# Patient Record
Sex: FEMALE | Race: WHITE
[De-identification: names, ages, dates, MRNs, and addresses within clinical notes are randomized per-mention and may not be internally consistent; named-entity substitution may affect disease eponyms.]

---

## 2018-03-25 ENCOUNTER — HOSPITAL ENCOUNTER (EMERGENCY)
Dept: HOSPITAL 50 - VM.ED | Age: 58
Discharge: HOME | End: 2018-03-25
Payer: COMMERCIAL

## 2018-03-25 DIAGNOSIS — R42: Primary | ICD-10-CM

## 2018-03-25 PROCEDURE — 99283 EMERGENCY DEPT VISIT LOW MDM: CPT

## 2018-03-25 PROCEDURE — 96374 THER/PROPH/DIAG INJ IV PUSH: CPT

## 2018-03-25 PROCEDURE — 96375 TX/PRO/DX INJ NEW DRUG ADDON: CPT

## 2018-03-25 PROCEDURE — 96361 HYDRATE IV INFUSION ADD-ON: CPT

## 2018-03-25 NOTE — EDM.PDOC
ED HPI GENERAL MEDICAL PROBLEM





- General


Chief Complaint: Neurological Problem


Stated Complaint: vertigo


Time Seen by Provider: 03/25/18 18:13


Source of Information: Reports: Patient, RN, RN Notes Reviewed


History Limitations: Reports: No Limitations





- History of Present Illness


INITIAL COMMENTS - FREE TEXT/NARRATIVE: 


Patient presents to the ED at [facility] with acute exacerbation of 

vertigo. Patient was diagnosed several years ago. She usually sees physical 

therapy and take medication, but recently this has not helped. She states she 

was seen by her therapist a couple weeks ago for treatment. Her vertigo was a 

little better but never really completely resolved. She states last night, it 

got worse. She did not take any home medication for the dizziness. She feels 

very nauseated. No focal neurological deficits. Otherwise, no other concerns.


Onset Date: 03/24/18





- Related Data


 Allergies











Allergy/AdvReac Type Severity Reaction Status Date / Time


 


No Known Allergies Allergy   Verified 06/13/16 18:29











Home Meds: 


 Home Meds





. [No Known Home Meds]  06/13/16 [History]











Past Medical History





- Past Health History


Medical/Surgical History: Denies Medical/Surgical History





Social & Family History





- Tobacco Use


Smoking Status *Q: Never Smoker





- Recreational Drug Use


Recreational Drug Use: No





ED ROS GENERAL





- Review of Systems


Review Of Systems: See Below


Constitutional: Denies: Fever, Chills, Weakness


HEENT: Reports: Vertigo


Respiratory: Denies: Shortness of Breath, Cough


Cardiovascular: Denies: Chest Pain, Palpitations


Skin: Reports: No Symptoms


Neurological: Reports: Dizziness.  Denies: Headache, Numbness, Paresthesia, 

Tingling





ED EXAM, DIZZINESS





- Physical Exam


Exam: See Below


Exam Limited By: No Limitations


General Appearance: Alert, No Apparent Distress


Eye Exam: Bilateral Eye: EOMI, Normal Inspection, PERRL


Ears: Normal External Exam, Normal Canal, Normal TMs


Head Exam: Atraumatic, Normocephalic


Vertigo: reproducible


Neck: Supple


Respiratory/Chest: No Respiratory Distress, Lungs Clear, Normal Breath Sounds


Cardiovascular: Normal Peripheral Pulses, Regular Rate, Rhythm


Neurological: Alert, Oriented x 3


Skin Exam: Warm, Dry, Intact, Normal Color





Course





- Vital Signs


Last Recorded V/S: 


 Last Vital Signs











Temp  36.2 C   03/25/18 18:15


 


Pulse  101 H  03/25/18 18:15


 


Resp  16   03/25/18 18:15


 


BP  145/92 H  03/25/18 18:15


 


Pulse Ox  99   03/25/18 18:15














- Orders/Labs/Meds


Orders: 


 Active Orders 24 hr











 Category Date Time Status


 


 Sodium Chloride 0.9% [Saline Flush] Med  03/25/18 18:17 Active





 10 ml FLUSH ASDIRECTED PRN   


 


 Peripheral IV Insertion Adult [OM.PC] Routine Oth  03/25/18 18:17 Ordered








 Medication Orders





Sodium Chloride (Saline Flush)  10 ml FLUSH ASDIRECTED PRN


   PRN Reason: Keep Vein Open








Meds: 


Medications











Generic Name Dose Route Start Last Admin





  Trade Name Freq  PRN Reason Stop Dose Admin


 


Sodium Chloride  10 ml  03/25/18 18:17  





  Saline Flush  FLUSH   





  ASDIRECTED PRN   





  Keep Vein Open   














Discontinued Medications














Generic Name Dose Route Start Last Admin





  Trade Name Freq  PRN Reason Stop Dose Admin


 


Diphenhydramine HCl  50 mg  03/25/18 19:39  03/25/18 19:44





  Benadryl  IVPUSH  03/25/18 19:40  50 mg





  ONETIME ONE   Administration


 


Lactated Ringer's  1,000 mls @ 999 mls/hr  03/25/18 18:18  03/25/18 18:31





  Ringers, Lactated  IV  03/25/18 19:18  999 mls/hr





  ONETIME ONE   Administration


 


Meclizine HCl  25 mg  03/25/18 18:18  03/25/18 18:30





  Antivert  PO  03/25/18 18:19  25 mg





  ONETIME ONE   Administration


 


Ondansetron HCl  4 mg  03/25/18 18:18  03/25/18 18:29





  Zofran  IVPUSH  03/25/18 18:19  4 mg





  ONETIME ONE   Administration














Departure





- Departure


Time of Disposition: 19:50


Disposition: Home, Self-Care 01


Condition: Good


Clinical Impression: 


 Vertigo








- Discharge Information


Instructions:  Vertigo


Referrals: 


Cinthya Rosenberg DO [Physician] - 


Forms:  ED Department Discharge


Additional Instructions: 


1. Stay well hydrated and rest


2. Use nausea medication as needed


3. May need additional treatments from therapy


4. See your Primary as symptoms warrant


5. Call us with any questions/concerns





- Problem List Review


Problem List Initiated/Reviewed/Updated: Yes





- My Orders


Last 24 Hours: 


My Active Orders





03/25/18 18:17


Sodium Chloride 0.9% [Saline Flush]   10 ml FLUSH ASDIRECTED PRN 


Peripheral IV Insertion Adult [OM.PC] Routine 














- Assessment/Plan


Last 24 Hours: 


My Active Orders





03/25/18 18:17


Sodium Chloride 0.9% [Saline Flush]   10 ml FLUSH ASDIRECTED PRN 


Peripheral IV Insertion Adult [OM.PC] Routine 











Assessment:: 


Vertigo


Plan: 


Vertigo symptoms better but not resolved at time of discharge. Patient advised 

to see or call her PCP for further evaluation if symptoms persist. Patient 

verbalized understanding and agrees with POC.

## 2022-03-04 ENCOUNTER — HOSPITAL ENCOUNTER (OUTPATIENT)
Dept: HOSPITAL 50 - VM.SDS | Age: 62
Discharge: HOME | End: 2022-03-04
Attending: SURGERY
Payer: COMMERCIAL

## 2022-03-04 DIAGNOSIS — D64.9: ICD-10-CM

## 2022-03-04 DIAGNOSIS — Z79.899: ICD-10-CM

## 2022-03-04 DIAGNOSIS — K21.9: ICD-10-CM

## 2022-03-04 DIAGNOSIS — E78.2: ICD-10-CM

## 2022-03-04 DIAGNOSIS — D12.5: Primary | ICD-10-CM

## 2022-03-04 DIAGNOSIS — F41.9: ICD-10-CM

## 2024-12-03 ENCOUNTER — HOSPITAL ENCOUNTER (EMERGENCY)
Dept: HOSPITAL 50 - VM.ED | Age: 64
Discharge: HOME | End: 2024-12-03
Payer: COMMERCIAL

## 2024-12-03 DIAGNOSIS — Z79.4: ICD-10-CM

## 2024-12-03 DIAGNOSIS — K21.9: ICD-10-CM

## 2024-12-03 DIAGNOSIS — I10: Primary | ICD-10-CM

## 2024-12-03 DIAGNOSIS — E78.00: ICD-10-CM

## 2024-12-03 DIAGNOSIS — Z79.899: ICD-10-CM

## 2024-12-03 LAB
ALBUMIN SERPL-MCNC: 4 G/DL (ref 3.4–5)
ALBUMIN/GLOB SERPL: 1.05 {RATIO}
ALP SERPL-CCNC: 105 U/L (ref 46–116)
ALT SERPL-CCNC: 39 U/L (ref 14–59)
ANION GAP SERPL CALC-SCNC: 14.7 MMOL/L (ref 5–15)
AST SERPL-CCNC: 23 U/L (ref 15–37)
BASOPHILS # BLD AUTO: 0 X10^3/UL (ref 0–0.2)
BASOPHILS NFR BLD AUTO: 0.4 % (ref 0.2–1.2)
BILIRUB SERPL-MCNC: 0.4 MG/DL (ref 0.2–1)
BUN SERPL-MCNC: 18 MG/DL (ref 7–18)
CALCIUM SERPL-MCNC: 9.1 MG/DL (ref 8.5–10.1)
CHLORIDE SERPL-SCNC: 102 MMOL/L (ref 98–107)
CO2 SERPL-SCNC: 27 MMOL/L (ref 21–32)
CREAT CL 24H UR+SERPL-VRATE: 60.6 ML/MIN
CREAT SERPL-MCNC: 0.9 MG/DL (ref 0.55–1.02)
EGFRCR SERPLBLD CKD-EPI 2021: 71 ML/MIN (ref 60–?)
EOSINOPHIL # BLD AUTO: 0.2 X10^3/UL (ref 0–0.5)
EOSINOPHIL NFR BLD AUTO: 2.1 % (ref 0–4)
GLOBULIN SER-MCNC: 3.8 G/DL
GLUCOSE SERPL-MCNC: 101 MG/DL (ref 70–99)
HCT VFR BLD AUTO: 38.4 % (ref 33–47)
HGB BLD-MCNC: 13.2 G/DL (ref 12–16)
IMM GRANULOCYTES # BLD: 0.01 X10^3/UL (ref 0–0.07)
IMM GRANULOCYTES NFR BLD: 0.1 % (ref 0–0.43)
LYMPHOCYTES # BLD AUTO: 1.9 X10^3/UL (ref 1–4.8)
LYMPHOCYTES NFR BLD AUTO: 26.5 % (ref 25–50)
MCH RBC QN AUTO: 31.4 PG (ref 26–32)
MCHC RBC AUTO-ENTMCNC: 34.4 G/DL (ref 32–36)
MCHC RBC AUTO-ENTMCNC: 91.2 FL (ref 78–93)
MONOCYTES # BLD AUTO: 0.6 X10^3/UL (ref 0–0.8)
MONOCYTES NFR BLD AUTO: 8.1 % (ref 2–11)
NEUTROPHILS # BLD AUTO: 4.4 X10^3/UL (ref 1.8–7.7)
NEUTROPHILS NFR BLD AUTO: 62.8 % (ref 50–80)
PLATELET # BLD AUTO: 253 X10^3/UL (ref 130–400)
POTASSIUM SERPL-SCNC: 3.7 MMOL/L (ref 3.5–5.1)
PROT SERPL-MCNC: 7.8 G/DL (ref 6.4–8.2)
RBC # BLD AUTO: 4.21 X10^6/UL (ref 4–5.5)
SODIUM SERPL-SCNC: 140 MMOL/L (ref 136–145)
TSH SERPL DL<=0.005 MIU/L-ACNC: 1.28 UIU/ML (ref 0.36–3.74)
WBC # BLD AUTO: 7 X10^3/UL (ref 4–10)

## 2024-12-03 PROCEDURE — 99285 EMERGENCY DEPT VISIT HI MDM: CPT

## 2024-12-03 PROCEDURE — 84484 ASSAY OF TROPONIN QUANT: CPT

## 2024-12-03 PROCEDURE — 85379 FIBRIN DEGRADATION QUANT: CPT

## 2024-12-03 PROCEDURE — 85025 COMPLETE CBC W/AUTO DIFF WBC: CPT

## 2024-12-03 PROCEDURE — 99284 EMERGENCY DEPT VISIT MOD MDM: CPT

## 2024-12-03 PROCEDURE — 93005 ELECTROCARDIOGRAM TRACING: CPT

## 2024-12-03 PROCEDURE — 93010 ELECTROCARDIOGRAM REPORT: CPT

## 2024-12-03 PROCEDURE — 71046 X-RAY EXAM CHEST 2 VIEWS: CPT

## 2024-12-03 PROCEDURE — 80053 COMPREHEN METABOLIC PANEL: CPT

## 2024-12-03 PROCEDURE — 36415 COLL VENOUS BLD VENIPUNCTURE: CPT

## 2024-12-03 PROCEDURE — 84443 ASSAY THYROID STIM HORMONE: CPT

## 2024-12-09 ENCOUNTER — HOSPITAL ENCOUNTER (EMERGENCY)
Dept: HOSPITAL 50 - VM.ED | Age: 64
Discharge: HOME | End: 2024-12-09
Payer: MEDICARE

## 2024-12-09 DIAGNOSIS — I16.9: Primary | ICD-10-CM

## 2024-12-09 DIAGNOSIS — K21.9: ICD-10-CM

## 2024-12-09 DIAGNOSIS — Z79.4: ICD-10-CM

## 2024-12-09 DIAGNOSIS — Z79.899: ICD-10-CM

## 2024-12-09 DIAGNOSIS — I10: ICD-10-CM

## 2024-12-09 LAB
ALBUMIN SERPL-MCNC: 3.7 G/DL (ref 3.4–5)
ALBUMIN/GLOB SERPL: 0.97 {RATIO}
ALP SERPL-CCNC: 88 U/L (ref 46–116)
ALT SERPL-CCNC: 26 U/L (ref 14–59)
ANION GAP SERPL CALC-SCNC: 16 MMOL/L (ref 5–15)
APTT PPP: 27.7 SEC (ref 21.9–33.8)
AST SERPL-CCNC: 18 U/L (ref 15–37)
BASOPHILS # BLD AUTO: 0 X10^3/UL (ref 0–0.2)
BASOPHILS NFR BLD AUTO: 0.3 % (ref 0.2–1.2)
BILIRUB SERPL-MCNC: 0.5 MG/DL (ref 0.2–1)
BUN SERPL-MCNC: 14 MG/DL (ref 7–18)
CALCIUM SERPL-MCNC: 9.4 MG/DL (ref 8.5–10.1)
CHLORIDE SERPL-SCNC: 104 MMOL/L (ref 98–107)
CO2 SERPL-SCNC: 25 MMOL/L (ref 21–32)
CREAT CL 24H UR+SERPL-VRATE: (no result) ML/MIN
CREAT SERPL-MCNC: 0.8 MG/DL (ref 0.55–1.02)
EGFRCR SERPLBLD CKD-EPI 2021: 82 ML/MIN (ref 60–?)
EOSINOPHIL # BLD AUTO: 0.1 X10^3/UL (ref 0–0.5)
EOSINOPHIL NFR BLD AUTO: 1.3 % (ref 0–4)
GLOBULIN SER-MCNC: 3.8 G/DL
GLUCOSE SERPL-MCNC: 108 MG/DL (ref 70–99)
HCT VFR BLD AUTO: 37.3 % (ref 33–47)
HGB BLD-MCNC: 13 G/DL (ref 12–16)
IMM GRANULOCYTES # BLD: 0 X10^3/UL (ref 0–0.07)
IMM GRANULOCYTES NFR BLD: 0 % (ref 0–0.43)
INR PPP: 1 (ref 0.9–1.1)
LYMPHOCYTES # BLD AUTO: 1.8 X10^3/UL (ref 1–4.8)
LYMPHOCYTES NFR BLD AUTO: 25.2 % (ref 25–50)
MAGNESIUM SERPL-MCNC: 2.1 MG/DL (ref 1.8–2.4)
MCH RBC QN AUTO: 31.4 PG (ref 26–32)
MCHC RBC AUTO-ENTMCNC: 34.9 G/DL (ref 32–36)
MCHC RBC AUTO-ENTMCNC: 90.1 FL (ref 78–93)
MONOCYTES # BLD AUTO: 0.4 X10^3/UL (ref 0–0.8)
MONOCYTES NFR BLD AUTO: 5.6 % (ref 2–11)
NEUTROPHILS # BLD AUTO: 4.8 X10^3/UL (ref 1.8–7.7)
NEUTROPHILS NFR BLD AUTO: 67.6 % (ref 50–80)
PLATELET # BLD AUTO: 270 X10^3/UL (ref 130–400)
POTASSIUM SERPL-SCNC: 4 MMOL/L (ref 3.5–5.1)
PROT SERPL-MCNC: 7.5 G/DL (ref 6.4–8.2)
PROTHROMBIN TIME: 10.1 SEC (ref 8.9–11.5)
RBC # BLD AUTO: 4.14 X10^6/UL (ref 4–5.5)
SODIUM SERPL-SCNC: 141 MMOL/L (ref 136–145)
TSH SERPL DL<=0.005 MIU/L-ACNC: 0.68 UIU/ML (ref 0.36–3.74)
WBC # BLD AUTO: 7.1 X10^3/UL (ref 4–10)

## 2024-12-09 PROCEDURE — 85610 PROTHROMBIN TIME: CPT

## 2024-12-09 PROCEDURE — 85025 COMPLETE CBC W/AUTO DIFF WBC: CPT

## 2024-12-09 PROCEDURE — 85730 THROMBOPLASTIN TIME PARTIAL: CPT

## 2024-12-09 PROCEDURE — 99284 EMERGENCY DEPT VISIT MOD MDM: CPT

## 2024-12-09 PROCEDURE — 93005 ELECTROCARDIOGRAM TRACING: CPT

## 2024-12-09 PROCEDURE — 71045 X-RAY EXAM CHEST 1 VIEW: CPT

## 2024-12-09 PROCEDURE — 96374 THER/PROPH/DIAG INJ IV PUSH: CPT

## 2024-12-09 PROCEDURE — 80053 COMPREHEN METABOLIC PANEL: CPT

## 2024-12-09 PROCEDURE — 84443 ASSAY THYROID STIM HORMONE: CPT

## 2024-12-09 PROCEDURE — 83735 ASSAY OF MAGNESIUM: CPT

## 2024-12-09 PROCEDURE — 84484 ASSAY OF TROPONIN QUANT: CPT

## 2024-12-09 PROCEDURE — 70450 CT HEAD/BRAIN W/O DYE: CPT
